# Patient Record
Sex: FEMALE | Race: WHITE | NOT HISPANIC OR LATINO | Employment: OTHER | URBAN - NONMETROPOLITAN AREA
[De-identification: names, ages, dates, MRNs, and addresses within clinical notes are randomized per-mention and may not be internally consistent; named-entity substitution may affect disease eponyms.]

---

## 2023-08-30 ENCOUNTER — OFFICE VISIT (OUTPATIENT)
Dept: URGENT CARE | Facility: CLINIC | Age: 68
End: 2023-08-30
Payer: MEDICARE

## 2023-08-30 VITALS
HEART RATE: 98 BPM | SYSTOLIC BLOOD PRESSURE: 163 MMHG | TEMPERATURE: 97 F | RESPIRATION RATE: 16 BRPM | OXYGEN SATURATION: 98 % | DIASTOLIC BLOOD PRESSURE: 93 MMHG

## 2023-08-30 DIAGNOSIS — S61.002A AVULSION OF SKIN OF LEFT THUMB WITHOUT COMPLICATION, INITIAL ENCOUNTER: Primary | ICD-10-CM

## 2023-08-30 DIAGNOSIS — S60.312A ABRASION OF LEFT THUMB, INITIAL ENCOUNTER: ICD-10-CM

## 2023-08-30 PROCEDURE — 90471 IMMUNIZATION ADMIN: CPT | Performed by: STUDENT IN AN ORGANIZED HEALTH CARE EDUCATION/TRAINING PROGRAM

## 2023-08-30 PROCEDURE — 90715 TDAP VACCINE 7 YRS/> IM: CPT

## 2023-08-30 PROCEDURE — 99204 OFFICE O/P NEW MOD 45 MIN: CPT | Performed by: STUDENT IN AN ORGANIZED HEALTH CARE EDUCATION/TRAINING PROGRAM

## 2023-08-30 PROCEDURE — G0463 HOSPITAL OUTPT CLINIC VISIT: HCPCS | Performed by: STUDENT IN AN ORGANIZED HEALTH CARE EDUCATION/TRAINING PROGRAM

## 2023-08-30 RX ORDER — PHENOL 1.4 %
10 AEROSOL, SPRAY (ML) MUCOUS MEMBRANE DAILY PRN
COMMUNITY

## 2023-08-30 RX ORDER — METOPROLOL SUCCINATE 25 MG/1
25 TABLET, EXTENDED RELEASE ORAL DAILY
COMMUNITY
Start: 2023-05-03

## 2023-08-30 RX ORDER — FLUOXETINE HYDROCHLORIDE 20 MG/1
20 CAPSULE ORAL DAILY
COMMUNITY
Start: 2023-08-17

## 2023-08-30 RX ORDER — VALSARTAN 320 MG/1
320 TABLET ORAL DAILY
COMMUNITY
Start: 2023-06-26

## 2023-08-30 RX ORDER — AMLODIPINE BESYLATE 10 MG/1
10 TABLET ORAL DAILY
COMMUNITY
Start: 2022-10-13 | End: 2023-10-13

## 2023-08-30 NOTE — PROGRESS NOTES
[facility]        NAME: Dean Kelly is a 77 y.o. female  : 1955    MRN: [id number]  DATE: 2023  TIME: 6:45 PM    Assessment and Plan   Avulsion of skin of left thumb without complication, initial encounter [S61.002A]  1. Avulsion of skin of left thumb without complication, initial encounter  Tdap Vaccine greater than or equal to 8yo    Laceration repair      2. Abrasion of left thumb, initial encounter  Tdap Vaccine greater than or equal to 8yo    Laceration repair        Good hemostasis achieved with silver nitrite. No sutures requires as epidermis is avulsed without loose edges to approximate. Pt unsure of last tdap so administered tdap. Provided warning signs for infection. Keep dressing on for 24 hours and after that change daily. Patient Instructions       Follow up with PCP in 3-5 days. Proceed to  ER if symptoms worsen. Chief Complaint     Chief Complaint   Patient presents with   • Thumb Laceration     Cut left thumb laceration while cleaning mandolin slicer  Took po antibiotics         History of Present Illness       HPI     Pt presenting with injury to left thumb. She was cleaning mandolin when it slipped and cut her left thumb with complete separation of skin. Pt reports this occurred about 3 hours ago and she has been holding pressure without hemostasis. Wound continues to bleed. Denies any gushing blood. Reports normal ROM and sensation in left thumb and hand. Unsure of last tdap. Took a dose of amoxicillin at home as she has hx of hip replacement so she wanted prophylaxis. Review of Systems   Review of Systems   Constitutional: Negative for chills and fever. HENT: Negative for ear pain and sore throat. Eyes: Negative for pain and visual disturbance. Respiratory: Negative for cough, chest tightness and shortness of breath. Cardiovascular: Negative for chest pain and palpitations.    Gastrointestinal: Negative for abdominal pain, constipation, diarrhea, nausea and vomiting. Genitourinary: Negative for dysuria, hematuria and menstrual problem. Musculoskeletal: Negative for arthralgias and back pain. Skin: Positive for wound. Negative for color change and rash. Neurological: Negative for seizures and syncope. Psychiatric/Behavioral: Negative for dysphoric mood and suicidal ideas. All other systems reviewed and are negative. Current Medications       Current Outpatient Medications:   •  amLODIPine (NORVASC) 10 mg tablet, Take 10 mg by mouth daily, Disp: , Rfl:   •  FLUoxetine (PROzac) 20 mg capsule, Take 20 mg by mouth daily, Disp: , Rfl:   •  Inclisiran Sodium (LEQVIO SC), Inject under the skin, Disp: , Rfl:   •  Melatonin 10 MG TABS, Take 10 mg by mouth daily as needed, Disp: , Rfl:   •  metoprolol succinate (TOPROL-XL) 25 mg 24 hr tablet, Take 25 mg by mouth daily, Disp: , Rfl:   •  valsartan (DIOVAN) 320 MG tablet, Take 320 mg by mouth daily, Disp: , Rfl:     Current Allergies     Allergies as of 08/30/2023 - Reviewed 08/30/2023   Allergen Reaction Noted   • Medical tape Other (See Comments) 04/03/2014   • Statins Other (See Comments) 07/12/2022            The following portions of the patient's history were reviewed and updated as appropriate: allergies, current medications, past family history, past medical history, past social history, past surgical history and problem list.     Past Medical History:   Diagnosis Date   • Hyperlipidemia    • Hypertension        Past Surgical History:   Procedure Laterality Date   • APPENDECTOMY     • CARDIAC SURGERY     • HIP ARTHROSCOPY Right    • HYSTERECTOMY         No family history on file. Medications have been verified. Objective   /93   Pulse 98   Temp (!) 97 °F (36.1 °C)   Resp 16   SpO2 98%        Physical Exam     Physical Exam  Constitutional:       General: She is not in acute distress. Appearance: Normal appearance.    HENT:      Head: Normocephalic and atraumatic. Eyes:      Extraocular Movements: Extraocular movements intact. Conjunctiva/sclera: Conjunctivae normal.   Cardiovascular:      Rate and Rhythm: Normal rate. Pulmonary:      Effort: Pulmonary effort is normal. No respiratory distress. Musculoskeletal:        Hands:       Comments: Sensation intact in left thumb and entire hand, strength intact, cap refill <2secs, ROM normal   Skin:     General: Skin is warm and dry. Neurological:      General: No focal deficit present. Mental Status: She is alert and oriented to person, place, and time. Psychiatric:         Mood and Affect: Mood normal.         Behavior: Behavior normal.           Universal Protocol:  Consent: Verbal consent obtained. Risks and benefits: risks, benefits and alternatives were discussed  Consent given by: patient  Patient understanding: patient states understanding of the procedure being performed  Patient identity confirmed: verbally with patient    Laceration repair    Date/Time: 8/30/2023 5:00 PM    Performed by: Samantha Nagy DO  Authorized by: Samantha Nagy DO  Body area: upper extremity  Location details: left thumb  Laceration length: 1 (1cm circular avulsion at the 1st digit palmar lateral DIP crease) cm  Foreign bodies: no foreign bodies  Tendon involvement: none  Nerve involvement: none  Vascular damage: no    Sedation:  Patient sedated: no        Procedure Details:  Irrigation solution: saline  Irrigation method: syringe  Amount of cleaning: standard  Debridement: none  Degree of undermining: none  Dressing: antibiotic ointment and pressure dressing  Patient tolerance: patient tolerated the procedure well with no immediate complications  Comments: Avulsion of epidermis, no skin edges to approximate. Controlled bleeding with silver nitrite with good hemostasis. Covered with topical bacitracin, gauze and coban for pressure. Foreign body: none.

## 2023-08-30 NOTE — PATIENT INSTRUCTIONS
Skin Avulsion   WHAT YOU NEED TO KNOW:   Skin avulsion is a wound that happens when skin is torn from your body during an accident or other injury. The torn skin may be lost or too damaged to be repaired, and it must be removed. A wound of this type cannot be stitched closed because there is tissue missing. Avulsion wounds are usually bigger and have more scars because of the missing tissue. DISCHARGE INSTRUCTIONS:   Medicines:   Antibiotic ointment:  Your healthcare provider may tell you to gently rub a topical antibiotic ointment on your wound. This will help prevent an infection and help your wound heal faster. Pain medicine  may be needed. Do not wait until your pain is severe before you take this medicine. The medicine may not work as well at controlling your pain if you wait too long to take it. Pain medicine can make you dizzy or sleepy. Prevent falls by asking for help when you want to get out of bed. NSAIDs , such as ibuprofen, help decrease swelling, pain, and fever. This medicine is available with or without a doctor's order. NSAIDs can cause stomach bleeding or kidney problems in certain people. If you take blood thinner medicine, always ask if NSAIDs are safe for you. Always read the medicine label and follow directions. Do not give these medicines to children younger than 6 months without direction from a healthcare provider. Take your medicine as directed. Contact your healthcare provider if you think your medicine is not helping or if you have side effects. Tell your provider if you are allergic to any medicine. Keep a list of the medicines, vitamins, and herbs you take. Include the amounts, and when and why you take them. Bring the list or the pill bottles to follow-up visits. Carry your medicine list with you in case of an emergency. Care for your wound:  Avulsion wounds may take longer to heal because they cannot be closed with tape or stitches.  Keep your wound clean and protected to prevent infection and speed healing. Clean your wound:  Wash your hands with soap and water before and after you care for your wound. You may be able to use a soft cloth to gently clean the wound after the first 24 to 48 hours. After that, gently clean the wound once or twice a day with cool water. Do not soak your wound. Use soap to clean around the wound, but try not to get any on the wound itself. Do not use alcohol or hydrogen peroxide to clean your wound unless you are directed to. Gently pat the area dry and reapply the bandage as directed. Elevate your wound:  Prop your injured area on pillows to raise it above the level of your heart. This will help reduce pain and swelling. Do this for 30 minutes at a time, as often as you can. Bandage your wound:  Bandages keep your wound clean, dry, and protected from infection. They may also prevent swelling. Use a bandage that does not stick to your wound, and has a spongy layer to absorb fluids. Leave your bandage on as long as directed. Ask your healthcare provider when and how to change your bandage. Do not wrap the bandage too tightly. This could cut off blood flow and cause more injury. Use cool compresses:  Wet a washcloth or towel with cool water and hold it on your wound as directed. Ask how often to apply the compress and for how long each time. Reduce scarring:  Avoid direct sunlight on your wound. Sunlight may burn or change the color of the new skin over your wound. Use sunscreen (SPF 30 or higher) on the new skin for at least 1 year after it heals. Support for leg and arm wounds: You may need to use crutches if the wound is on your leg. You may need to use a sling if the wound is on your arm. Crutches and slings help protect the injured area, prevent further injury, and heal the area in the right position. Follow up with your healthcare provider within 2 days or as directed:   If you have stitches, ask when to return to have them removed. Write down your questions so you remember to ask them during your visits. Contact your healthcare provider if:   You have new pain, or it gets worse. You have trouble moving the injured body area. Your wound splits open or does not seem to be healing. Return to the emergency department if:   You have a fever. You have painful swelling, redness, or warmth around your wound. Your wound is red and there are red streaks on your skin starting at your wound and moving upward. Your wound is draining pus. You have heavy bleeding or bleeding that does not stop after 10 minutes of holding firm, direct pressure over the wound. You feel like there is an object stuck in your wound. © Copyright [employer]arte 2022 Information is for End User's use only and may not be sold, redistributed or otherwise used for commercial purposes. The above information is an  only. It is not intended as medical advice for individual conditions or treatments. Talk to your doctor, nurse or pharmacist before following any medical regimen to see if it is safe and effective for you.